# Patient Record
Sex: FEMALE
[De-identification: names, ages, dates, MRNs, and addresses within clinical notes are randomized per-mention and may not be internally consistent; named-entity substitution may affect disease eponyms.]

---

## 2019-03-17 ENCOUNTER — HOSPITAL ENCOUNTER (EMERGENCY)
Dept: HOSPITAL 31 - C.ER | Age: 84
Discharge: HOME | End: 2019-03-17
Payer: COMMERCIAL

## 2019-03-17 VITALS — SYSTOLIC BLOOD PRESSURE: 151 MMHG | RESPIRATION RATE: 16 BRPM | DIASTOLIC BLOOD PRESSURE: 67 MMHG | HEART RATE: 67 BPM

## 2019-03-17 VITALS — BODY MASS INDEX: 25.1 KG/M2

## 2019-03-17 VITALS — OXYGEN SATURATION: 98 %

## 2019-03-17 VITALS — TEMPERATURE: 97.6 F

## 2019-03-17 DIAGNOSIS — J40: Primary | ICD-10-CM

## 2019-03-17 LAB
ALBUMIN SERPL-MCNC: 4 G/DL (ref 3.5–5)
ALBUMIN/GLOB SERPL: 1.3 {RATIO} (ref 1–2.1)
ALT SERPL-CCNC: 15 U/L (ref 9–52)
AST SERPL-CCNC: 21 U/L (ref 14–36)
BASOPHILS # BLD AUTO: 0 K/UL (ref 0–0.2)
BASOPHILS NFR BLD: 0.6 % (ref 0–2)
BNP SERPL-MCNC: 318 PG/ML (ref 0–900)
BUN SERPL-MCNC: 21 MG/DL (ref 7–17)
CALCIUM SERPL-MCNC: 9.2 MG/DL (ref 8.6–10.4)
EOSINOPHIL # BLD AUTO: 0.1 K/UL (ref 0–0.7)
EOSINOPHIL NFR BLD: 2.8 % (ref 0–4)
ERYTHROCYTE [DISTWIDTH] IN BLOOD BY AUTOMATED COUNT: 13.8 % (ref 11.5–14.5)
GFR NON-AFRICAN AMERICAN: > 60
HGB BLD-MCNC: 12.9 G/DL (ref 11–16)
LYMPHOCYTES # BLD AUTO: 1.4 K/UL (ref 1–4.3)
LYMPHOCYTES NFR BLD AUTO: 39.5 % (ref 20–40)
MCH RBC QN AUTO: 29.7 PG (ref 27–31)
MCHC RBC AUTO-ENTMCNC: 32.4 G/DL (ref 33–37)
MCV RBC AUTO: 91.7 FL (ref 81–99)
MONOCYTES # BLD: 0.5 K/UL (ref 0–0.8)
MONOCYTES NFR BLD: 13.9 % (ref 0–10)
NEUTROPHILS # BLD: 1.5 K/UL (ref 1.8–7)
NEUTROPHILS NFR BLD AUTO: 43.2 % (ref 50–75)
NRBC BLD AUTO-RTO: 0 % (ref 0–2)
PLATELET # BLD: 175 K/UL (ref 130–400)
PMV BLD AUTO: 9.5 FL (ref 7.2–11.7)
RBC # BLD AUTO: 4.35 MIL/UL (ref 3.8–5.2)
WBC # BLD AUTO: 3.5 K/UL (ref 4.8–10.8)

## 2019-03-17 NOTE — C.PDOC
History Of Present Illness





89 y/o female comes in complaining of a 4 day history of persistent coughing, 

which she describes as dry, and is associated with mild nasal congestion and 

SOB. Patient denies chest pain, hemoptysis, fever, or chills. Denies any recent 

travel or sick contacts. 





Time Seen by Provider: 03/17/19 17:02


Chief Complaint (Nursing): Cough, Cold, Congestion


History Per: Patient


History/Exam Limitations: no limitations


Onset/Duration Of Symptoms: Days


Current Symptoms Are (Timing): Still Present





Past Medical History


Reviewed: Historical Data, Nursing Documentation, Vital Signs


Vital Signs: 





                                Last Vital Signs











Temp  99.1 F   03/17/19 16:57


 


Pulse  68   03/17/19 17:29


 


Resp  17   03/17/19 17:29


 


BP  146/67   03/17/19 17:29


 


Pulse Ox  98   03/17/19 17:29














- Medical History


PMH: HTN


   Denies: Chronic Kidney Disease


Surgical History: Cholecystectomy


Family History: States: No Known Family Hx





- Social History


Hx Tobacco Use: No


Hx Alcohol Use: No


Hx Substance Use: No





- Immunization History


Hx Tetanus Toxoid Vaccination: No


Hx Influenza Vaccination: Yes (Sept 2018)


Hx Pneumococcal Vaccination: Yes (2018)





Review Of Systems


Except As Marked, All Systems Reviewed And Found Negative.


Constitutional: Negative for: Fever, Chills


ENT: Positive for: Nose Congestion


Cardiovascular: Negative for: Chest Pain


Respiratory: Positive for: Cough (dry), Shortness of Breath.  Negative for: 

Hemoptysis





Physical Exam





- Physical Exam


Appears: Well, Non-toxic, No Acute Distress


Skin: Warm, Dry, No Rash


Head: Atraumatic, Normacephalic


Eye(s): bilateral: Normal Inspection, PERRL, EOMI


Ear(s): Bilateral: Normal


Oral Mucosa: Moist


Throat: Normal, No Erythema, No Exudate, Other (uvula midline)


Neck: Normal ROM, Supple


Chest: Symmetrical, No Tenderness


Cardiovascular: Rhythm Regular, No Friction Rub, No Murmur


Respiratory: Normal Breath Sounds, No Rales, No Rhonchi, No Wheezing


Gastrointestinal/Abdominal: Soft, No Tenderness


Back: Normal Inspection, No CVA Tenderness


Extremity: Normal ROM, No Pedal Edema, No Swelling


Extremity: Bilateral: Atraumatic, Normal ROM


Neurological/Psych: Oriented x3, Normal Speech, Normal Cognition, Normal Motor


Gait: Steady





ED Course And Treatment





- Laboratory Results


Result Diagrams: 


                                 03/17/19 17:43





                                 03/17/19 17:43


O2 Sat by Pulse Oximetry: 98 (RA)


Pulse Ox Interpretation: Normal





- Other Rad


  ** CXR


X-Ray: Read By Radiologist


Interpretation: FINDINGS:  LUNGS:  Clear.Incidental finding(s): 3 mm left lower 

lobe calcified granuloma.  PLEURA:  No pneumothorax or pleural fluid seen.  

CARDIOVASCULAR:  Atherosclerotic calcifications identified primarily aortic 

arch.  Normal.  OSSEOUS STRUCTURES:  No significant abnormalities.  VISUALIZED 

UPPER ABDOMEN:  Normal.  OTHER FINDINGS:  None.  IMPRESSION:  No active disease.

No acute/significant interval changes.





Medical Decision Making


Medical Decision Making: 





Plan:


--Labs


--Chest XR 





Labs and CXR discussed with the patient. Possible infiltrate will treat with 

zithromax. 





Disposition





- Disposition


Referrals: 


[facility] [Outside]


Disposition: HOME/ ROUTINE


Disposition Time: 18:10


Condition: GOOD


Additional Instructions: 


follow up with the medical doctor within 1-2 days. Return if worsened. 


Prescriptions: 


Azithromycin [Zithromax] 250 mg PO DAILY #4 tab


Benzonatate 200 mg PO TID PRN #30 capsule


 PRN Reason: Cough


predniSONE [Prednisone] 20 mg PO BID #10 tab


Instructions:  Acute Bronchitis


Forms:  [employer] (English)


Print Language: Bulgarian





- Clinical Impression


Clinical Impression: 


 Bronchitis








- PA / NP / Resident Statement


MD/DO has reviewed & agrees with the documentation as recorded.





- Scribe Statement


The provider has reviewed the documentation as recorded by the Scribe





Jaja Sood





All medical record entries made by the Scribe were at my direction and 

personally dictated by me. I have reviewed the chart and agree that the record 

accurately reflects my personal performance of the history, physical exam, 

medical decision making, and the department course for this patient. I have also

 personally directed, reviewed, and agree with the discharge instructions and 

disposition.

## 2019-03-17 NOTE — RAD
Date of service: 



03/17/2019



PROCEDURE:  CHEST RADIOGRAPH, 1 VIEW



HISTORY:

SOB, cough



COMPARISON:

12/15/2018.



FINDINGS:



LUNGS:

Clear.Incidental finding(s): 3 mm left lower lobe calcified 

granuloma. 



PLEURA:

No pneumothorax or pleural fluid seen.



CARDIOVASCULAR:

Atherosclerotic calcifications identified primarily aortic arch.



 Normal.



OSSEOUS STRUCTURES:

No significant abnormalities.



VISUALIZED UPPER ABDOMEN:

Normal.



OTHER FINDINGS:

None. 



IMPRESSION:

No active disease. No acute/significant interval changes.